# Patient Record
Sex: FEMALE | Race: OTHER | NOT HISPANIC OR LATINO | URBAN - METROPOLITAN AREA
[De-identification: names, ages, dates, MRNs, and addresses within clinical notes are randomized per-mention and may not be internally consistent; named-entity substitution may affect disease eponyms.]

---

## 2021-03-27 ENCOUNTER — EMERGENCY (EMERGENCY)
Facility: HOSPITAL | Age: 61
LOS: 1 days | Discharge: HOME | End: 2021-03-27
Attending: EMERGENCY MEDICINE | Admitting: EMERGENCY MEDICINE
Payer: MEDICAID

## 2021-03-27 VITALS
SYSTOLIC BLOOD PRESSURE: 138 MMHG | TEMPERATURE: 98 F | DIASTOLIC BLOOD PRESSURE: 80 MMHG | HEART RATE: 100 BPM | OXYGEN SATURATION: 100 % | RESPIRATION RATE: 18 BRPM

## 2021-03-27 LAB — SARS-COV-2 RNA SPEC QL NAA+PROBE: SIGNIFICANT CHANGE UP

## 2021-03-27 PROCEDURE — 71046 X-RAY EXAM CHEST 2 VIEWS: CPT | Mod: 26

## 2021-03-27 PROCEDURE — 99284 EMERGENCY DEPT VISIT MOD MDM: CPT

## 2021-03-27 RX ORDER — LORATADINE 10 MG/1
1 TABLET ORAL
Qty: 10 | Refills: 0
Start: 2021-03-27 | End: 2021-04-05

## 2021-03-27 RX ORDER — ALBUTEROL 90 UG/1
1 AEROSOL, METERED ORAL ONCE
Refills: 0 | Status: COMPLETED | OUTPATIENT
Start: 2021-03-27 | End: 2021-03-27

## 2021-03-27 RX ORDER — FAMOTIDINE 10 MG/ML
1 INJECTION INTRAVENOUS
Qty: 10 | Refills: 0
Start: 2021-03-27 | End: 2021-04-05

## 2021-03-27 RX ADMIN — Medication 100 MILLIGRAM(S): at 12:44

## 2021-03-27 RX ADMIN — ALBUTEROL 1 PUFF(S): 90 AEROSOL, METERED ORAL at 12:44

## 2021-03-27 NOTE — ED PROVIDER NOTE - NSFOLLOWUPINSTRUCTIONS_ED_ALL_ED_FT
1) Please follow up with your PCP in the next week.    2) You were sent medications to your pharmacy. Please use as prescribed.    3) Please return to ED if you develop increasing shortness of breath, fevers, chest pain, or any other new concerning symptoms. 1) Please follow up with your PCP in the next week.    2) You were sent medications to your pharmacy. Please use as prescribed.  - Claritin: please take 1 pill a day  - Tessalon Perles: You can take 1 pill, 3 time a day, as needed for cough  - Pepcid: For reflux. Can take 1 pill at bedtime per day  - Hycodan: Please drink 5 mililiters every 6 hours, as needed for cough    3) Please return to ED if you develop increasing shortness of breath, fevers, chest pain, or any other new concerning symptoms.

## 2021-03-27 NOTE — ED PROVIDER NOTE - OBJECTIVE STATEMENT
59 y/o F, PMH of GERD, presents to ED c/o chronic dry cough x years. Pt states that when she has the cough she feels like she "cannot speak" and that it causes her chest to hurt. She states that when she coughs she also urinates herself a little. Per family, the cough mostly happens during the winter months. Pt denies smoking hx. Pt denies fever, chills, abd pain, n/v/d, dysuria, frequent urination, SOB, or LE edema.

## 2021-03-27 NOTE — ED PROVIDER NOTE - ATTENDING CONTRIBUTION TO CARE
Dr. Barrios:  I have personally performed a face to face bedside history and physical examination of this patient. I have discussed the history, examination, review of systems, assessment and plan of management with the resident. I have reviewed the electronic medical record and amended it to reflect my history, review of systems, physical exam, assessment and plan.    60F h/o GERD presents with c/o chronic cough x years.  States that she has episodic coughing fits during which she feels she "cannot speak", +associated chest pain when coughing.  Worse during winter months.  ROS otherwise negative.  Had been given various "cold remedies" by her PCP with little improvement.    Exam:  - nad  - rrr   -ctab   -abd soft ntnd    A/P  - chronic cough, consider intrinsic lung disease vs post-nasal drip vs GERD, r/o PNA  - CXR  - cough meds, f/u PCP

## 2021-03-27 NOTE — ED PROVIDER NOTE - PATIENT PORTAL LINK FT
You can access the FollowMyHealth Patient Portal offered by [facility] by registering at the following website: [email or web address]. By joining [employer]’s FollowMyHealth portal, you will also be able to view your health information using other applications (apps) compatible with our system.

## 2021-03-27 NOTE — ED ADULT TRIAGE NOTE - CHIEF COMPLAINT QUOTE
pt s son states cough since february, no fever shills or sob. son states mom gets this cough every year.  pt has not been tested/  pt c/o rib pain from cough/  pt speaks Hungarian  son s  Mathew

## 2021-03-27 NOTE — ED PROVIDER NOTE - CLINICAL SUMMARY MEDICAL DECISION MAKING FREE TEXT BOX
61 y/o F, PMH of GERD, presents to ED c/o chronic dry cough x years a/w post-tussive CP. No CP at rest. Pt denies f/c, SOB, abd pain, or n/v/d.   Likely chronic bronchitis vs. viral syndrome. Low suspicion for PNA.  Will check CXR and covid swab. Will also give ventolin and tessalon perles for relief. Reassess. Likely d/c home w/ PCP f/u.

## 2021-05-25 NOTE — ED PROVIDER NOTE - NS ED MD DISPO DISCHARGE CCDA
I leida texted Dr Ruth Chaudhari that patient is still having 10 out of 10 pain in her neck area -she had 100 scg of IV Fentanyl in PACU   He replied that her chest X-ray was okay and no furthur orders on pt 
I leida texted the anesthesiologist to come over and assess pt for her continuing neck pain of 8/10-then I went to lunch and gave report to Woodward Dancer RN 
Patient/Caregiver provided printed discharge information.

## 2021-11-15 PROBLEM — Z00.00 ENCOUNTER FOR PREVENTIVE HEALTH EXAMINATION: Status: ACTIVE | Noted: 2021-11-15

## 2021-11-15 PROBLEM — K21.9 GASTRO-ESOPHAGEAL REFLUX DISEASE WITHOUT ESOPHAGITIS: Chronic | Status: ACTIVE | Noted: 2021-03-27

## 2021-11-18 ENCOUNTER — APPOINTMENT (OUTPATIENT)
Dept: NEUROSURGERY | Facility: CLINIC | Age: 61
End: 2021-11-18
Payer: MEDICAID

## 2021-11-18 VITALS
HEIGHT: 59 IN | DIASTOLIC BLOOD PRESSURE: 84 MMHG | HEART RATE: 84 BPM | BODY MASS INDEX: 32.25 KG/M2 | SYSTOLIC BLOOD PRESSURE: 136 MMHG | TEMPERATURE: 97.3 F | WEIGHT: 160 LBS | OXYGEN SATURATION: 100 %

## 2021-11-18 DIAGNOSIS — Z78.9 OTHER SPECIFIED HEALTH STATUS: ICD-10-CM

## 2021-11-18 DIAGNOSIS — M24.28 DISORDER OF LIGAMENT, VERTEBRAE: ICD-10-CM

## 2021-11-18 DIAGNOSIS — M54.12 RADICULOPATHY, CERVICAL REGION: ICD-10-CM

## 2021-11-18 DIAGNOSIS — M54.17 RADICULOPATHY, LUMBOSACRAL REGION: ICD-10-CM

## 2021-11-18 DIAGNOSIS — Z87.39 PERSONAL HISTORY OF OTHER DISEASES OF THE MUSCULOSKELETAL SYSTEM AND CONNECTIVE TISSUE: ICD-10-CM

## 2021-11-18 PROCEDURE — 99203 OFFICE O/P NEW LOW 30 MIN: CPT

## 2021-11-18 RX ORDER — GABAPENTIN 300 MG/1
300 CAPSULE ORAL
Refills: 0 | Status: ACTIVE | COMMUNITY

## 2021-11-18 RX ORDER — DICLOFENAC POTASSIUM 50 MG/1
50 TABLET, COATED ORAL
Refills: 0 | Status: ACTIVE | COMMUNITY

## 2021-11-18 RX ORDER — CYCLOBENZAPRINE HYDROCHLORIDE 10 MG/1
10 TABLET, FILM COATED ORAL
Refills: 0 | Status: ACTIVE | COMMUNITY

## 2021-11-18 RX ORDER — AMITRIPTYLINE HYDROCHLORIDE 25 MG/1
25 TABLET, FILM COATED ORAL
Refills: 0 | Status: ACTIVE | COMMUNITY

## 2021-11-19 NOTE — PHYSICAL EXAM
[General Appearance - Alert] : alert [General Appearance - In No Acute Distress] : in no acute distress [Oriented To Time, Place, And Person] : oriented to person, place, and time [Impaired Insight] : insight and judgment were intact [Affect] : the affect was normal [2+] : Patella left 2+ [No Visual Abnormalities] : no visible abnormailities [Antalgic] : antalgic [5] : 5/5 Ankle Plantar Flexion (S1) [Intact] : all reflexes within normal limits bilaterally [Straight-Leg Raise Test - Left] : straight leg raise of the left leg was negative [Straight-Leg Raise Test - Right] : straight leg raise  of the right leg was negative [4] : 4/5 Small Finger Abduction (T1)

## 2021-11-19 NOTE — HISTORY OF PRESENT ILLNESS
[> 3 months] : more  than 3 months [de-identified] : The patient is a 61 y/o, female with a hx of cervical laminoplasty C3-6 Dr. Santa Lopez at [place] in 2019 c/o mid back pain. She reports the pain starts on the Right side where her bra strap is. She also has burning sensation with numbness and tingling of b/l shoulders radiating to hands/ fingers. She rates the pain an 8/10. The pt has LOB and has fallen twice, last fall was last month. She also has b/l posterior aspect of leg burning up to her knee with plantar feet b/l pain. The pt has been undergoing PT which helps her ROM. The pt has not been seen by pain management. She has been taking gabapentin, amitriptyline, cyclobenzaprine, and Tylenol. . \par The pt had CT cervical spine 11/5/21.

## 2021-11-19 NOTE — REVIEW OF SYSTEMS
[As Noted in HPI] : as noted in HPI [Abnormal Sensation] : an abnormal sensation [Difficulty Walking] : difficulty walking [Negative] : Heme/Lymph [de-identified] : Neck and lower back pain

## 2021-11-19 NOTE — ASSESSMENT
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 \par - Will obtain MRI cervical and lumbar spine to examine herniation and soft tissue. Will also obtain cervical and lumbar xray to r/o instability\par - Pt with ossification of pll, surgery recommended. Pt counseled. All questions and concerns addressed. Spent more than 30 min with pt. \par - Pt to consider surgery and f/u in near future. Advised no injections to spine taking into consideration there is no space.

## 2021-12-23 ENCOUNTER — APPOINTMENT (OUTPATIENT)
Dept: NEUROSURGERY | Facility: CLINIC | Age: 61
End: 2021-12-23
Payer: MEDICAID

## 2021-12-23 VITALS
SYSTOLIC BLOOD PRESSURE: 111 MMHG | HEART RATE: 88 BPM | HEIGHT: 59 IN | DIASTOLIC BLOOD PRESSURE: 72 MMHG | BODY MASS INDEX: 32.25 KG/M2 | TEMPERATURE: 97.9 F | OXYGEN SATURATION: 100 % | WEIGHT: 160 LBS

## 2021-12-23 DIAGNOSIS — M54.2 CERVICALGIA: ICD-10-CM

## 2021-12-23 PROCEDURE — 99213 OFFICE O/P EST LOW 20 MIN: CPT

## 2021-12-23 NOTE — ASSESSMENT
[FreeTextEntry1] : Her MRI of the cervical spine is better than pre op. She does not want surgery. I think her best option is to continue with PT.

## 2021-12-23 NOTE — HISTORY OF PRESENT ILLNESS
[> 3 months] : more  than 3 months [de-identified] : 12-23-21 She comes here for follow up. She had her MRi's done. She still complaints of right arm pain and flank pain. She states she does not want to consider surgery. She has no bowel or bladder incontinence. \par \par 12-15-21\par The patient is a 59 y/o, female with a hx of cervical laminoplasty C3-6 Dr. Santa Lopez at [place] in 2019 c/o mid back pain. She reports the pain starts on the Right side where her bra strap is. She also has burning sensation with numbness and tingling of b/l shoulders radiating to hands/ fingers. She rates the pain an 8/10. The pt has LOB and has fallen twice, last fall was last month. She also has b/l posterior aspect of leg burning up to her knee with plantar feet b/l pain. The pt has been undergoing PT which helps her ROM. The pt has not been seen by pain management. She has been taking gabapentin, amitriptyline, cyclobenzaprine, and Tylenol. . \par The pt had CT cervical spine 11/5/21.

## 2021-12-23 NOTE — PHYSICAL EXAM
[General Appearance - Alert] : alert [General Appearance - In No Acute Distress] : in no acute distress [Oriented To Time, Place, And Person] : oriented to person, place, and time [Impaired Insight] : insight and judgment were intact [Affect] : the affect was normal [2+] : Patella left 2+ [No Visual Abnormalities] : no visible abnormailities [Straight-Leg Raise Test - Left] : straight leg raise of the left leg was negative [Straight-Leg Raise Test - Right] : straight leg raise  of the right leg was negative [Antalgic] : antalgic [4] : 4/5 Knee Extensor (L3) [5] : 5/5 Ankle Plantar Flexion (S1) [Intact] : all reflexes within normal limits bilaterally

## 2022-06-29 ENCOUNTER — APPOINTMENT (OUTPATIENT)
Dept: UROGYNECOLOGY | Facility: CLINIC | Age: 62
End: 2022-06-29

## 2022-06-29 VITALS
WEIGHT: 148 LBS | DIASTOLIC BLOOD PRESSURE: 89 MMHG | SYSTOLIC BLOOD PRESSURE: 129 MMHG | BODY MASS INDEX: 29.84 KG/M2 | HEART RATE: 88 BPM | HEIGHT: 59 IN

## 2022-06-29 DIAGNOSIS — N39.3 STRESS INCONTINENCE (FEMALE) (MALE): ICD-10-CM

## 2022-06-29 DIAGNOSIS — N39.41 URGE INCONTINENCE: ICD-10-CM

## 2022-06-29 DIAGNOSIS — R35.0 FREQUENCY OF MICTURITION: ICD-10-CM

## 2022-06-29 PROCEDURE — 99204 OFFICE O/P NEW MOD 45 MIN: CPT | Mod: 25

## 2022-06-29 PROCEDURE — 51701 INSERT BLADDER CATHETER: CPT

## 2022-06-29 PROCEDURE — 99072 ADDL SUPL MATRL&STAF TM PHE: CPT

## 2022-06-29 NOTE — DISCUSSION/SUMMARY
[FreeTextEntry1] : Patient presents with symptoms of mixed urinary incontinence. We discussed possible etiologies of her symptoms including both stress urinary incontinence and overactive bladder. We reviewed management options for both conditions. I recommend further workup of her urinary symptoms with urodynamic testing. We reviewed behavioral and fluid modifications. Written and verbal instructions  were provided to her as well. She will return to my office for urodynamics and followup with me to discuss results and management options further.

## 2022-06-29 NOTE — PHYSICAL EXAM
[Chaperone Present] : A chaperone was present in the examining room during all aspects of the physical examination [Labia Majora] : were normal [Normal Appearance] : general appearance was normal [Atrophy] : atrophy [Normal] : no abnormalities [Exam Deferred] : was deferred [FreeTextEntry1] : General: Well, appearing. Alert and orientated. No acute distress\par HEENT: Normocephalic, atraumatic and extraocular muscles appear to be intact \par Neck: Full range of motion, no obvious lymphadenopathy, deformities, or masses noted \par Respiratory: Speaking in full sentences comfortably, normal work of breathing and no cough during visit\par Musculoskeletal: active full range of motion in extremities \par Extremities: No upper extremity edema noted\par Skin: no obvious rash or skin lesions\par Neuro: Orientated X 3, speech is fluent, normal rate\par Psych: Normal mood and affect \par   [Tenderness] : ~T no ~M abdominal tenderness observed [Distended] : not distended

## 2022-06-29 NOTE — HISTORY OF PRESENT ILLNESS
[Unable To Restrain Bowel Movement] : moderate [Feelings Of Urinary Urgency] : moderate [Urinary Frequency More Than Twice At Night (Nocturia)] : no nocturia [Urinary Tract Infection] : mild [] : years ago [de-identified] : 0-1 [FreeTextEntry1] : \par \par PSH: h/o cervical laminplasty\par \par 2-3 c tea daily

## 2022-06-29 NOTE — REASON FOR VISIT
[Family Member] : family member [[employer] ] : provided by [employer]   [Questionnaire Received] : Patient questionnaire received [Intake Form Reviewed] : Patient intake form with past medical history, surgical history, family history and social history reviewed today [Urine Frequency] : urine frequency [TWNoteComboBox1] : Gael

## 2022-07-01 ENCOUNTER — NON-APPOINTMENT (OUTPATIENT)
Age: 62
End: 2022-07-01

## 2022-07-01 DIAGNOSIS — Z78.9 OTHER SPECIFIED HEALTH STATUS: ICD-10-CM

## 2022-07-01 LAB — BACTERIA UR CULT: NORMAL

## 2022-07-07 ENCOUNTER — NON-APPOINTMENT (OUTPATIENT)
Age: 62
End: 2022-07-07

## 2022-07-07 DIAGNOSIS — R31.29 OTHER MICROSCOPIC HEMATURIA: ICD-10-CM

## 2022-07-07 LAB
APPEARANCE: ABNORMAL
BACTERIA: NEGATIVE
BILIRUBIN URINE: NEGATIVE
BLOOD URINE: NEGATIVE
CALCIUM OXALATE CRYSTALS: ABNORMAL
COLOR: NORMAL
GLUCOSE QUALITATIVE U: NEGATIVE
HYALINE CASTS: 0 /LPF
KETONES URINE: NEGATIVE
LEUKOCYTE ESTERASE URINE: NEGATIVE
MICROSCOPIC-UA: NORMAL
NITRITE URINE: NEGATIVE
PH URINE: 8
PROTEIN URINE: NORMAL
RED BLOOD CELLS URINE: 4 /HPF
SPECIFIC GRAVITY URINE: 1.02
SQUAMOUS EPITHELIAL CELLS: 1 /HPF
URINE COMMENTS: NORMAL
UROBILINOGEN URINE: NORMAL
WHITE BLOOD CELLS URINE: 2 /HPF

## 2022-07-28 ENCOUNTER — APPOINTMENT (OUTPATIENT)
Dept: UROGYNECOLOGY | Facility: CLINIC | Age: 62
End: 2022-07-28

## 2022-08-02 ENCOUNTER — APPOINTMENT (OUTPATIENT)
Dept: UROGYNECOLOGY | Facility: CLINIC | Age: 62
End: 2022-08-02

## 2022-08-11 ENCOUNTER — NON-APPOINTMENT (OUTPATIENT)
Age: 62
End: 2022-08-11

## 2023-05-01 ENCOUNTER — EMERGENCY (EMERGENCY)
Facility: HOSPITAL | Age: 63
LOS: 1 days | Discharge: HOME | End: 2023-05-01
Attending: EMERGENCY MEDICINE | Admitting: EMERGENCY MEDICINE
Payer: MEDICAID

## 2023-05-01 VITALS
OXYGEN SATURATION: 100 % | HEART RATE: 89 BPM | DIASTOLIC BLOOD PRESSURE: 72 MMHG | SYSTOLIC BLOOD PRESSURE: 149 MMHG | RESPIRATION RATE: 18 BRPM | TEMPERATURE: 98 F

## 2023-05-01 PROCEDURE — 99284 EMERGENCY DEPT VISIT MOD MDM: CPT

## 2023-05-01 PROCEDURE — 71101 X-RAY EXAM UNILAT RIBS/CHEST: CPT | Mod: 26,LT

## 2023-05-01 PROCEDURE — 72131 CT LUMBAR SPINE W/O DYE: CPT | Mod: 26,MA

## 2023-05-01 RX ORDER — LIDOCAINE 4 G/100G
1 CREAM TOPICAL ONCE
Refills: 0 | Status: COMPLETED | OUTPATIENT
Start: 2023-05-01 | End: 2023-05-01

## 2023-05-01 RX ORDER — ACETAMINOPHEN 500 MG
650 TABLET ORAL ONCE
Refills: 0 | Status: COMPLETED | OUTPATIENT
Start: 2023-05-01 | End: 2023-05-01

## 2023-05-01 RX ORDER — KETOROLAC TROMETHAMINE 30 MG/ML
15 SYRINGE (ML) INJECTION ONCE
Refills: 0 | Status: DISCONTINUED | OUTPATIENT
Start: 2023-05-01 | End: 2023-05-01

## 2023-05-01 RX ADMIN — Medication 15 MILLIGRAM(S): at 19:57

## 2023-05-01 RX ADMIN — LIDOCAINE 1 PATCH: 4 CREAM TOPICAL at 21:10

## 2023-05-01 RX ADMIN — Medication 650 MILLIGRAM(S): at 19:57

## 2023-05-01 NOTE — ED PROVIDER NOTE - CLINICAL SUMMARY MEDICAL DECISION MAKING FREE TEXT BOX
62-year-old female with past medical history edema on Lasix, currently on cefuroxime for a UTI presenting to the ER with low back pain after a fall.  On exam patient appears uncomfortable, vitals within normal, tenderness along lumbar spine, neurovascularly intact.  Patient is able to ambulate although with supportive assistance from her son.  Plan: CT lumbar spine to rule out fracture, pain control.

## 2023-05-01 NOTE — ED PROVIDER NOTE - PHYSICAL EXAMINATION
MSK: lumbar midline spinal tenderness, no c-spine tenderness  strength 5/5 in b/l LE, sensation intact and equal b/l  pt is able to ambulate with assistance

## 2023-05-01 NOTE — ED ADULT TRIAGE NOTE - CHIEF COMPLAINT QUOTE
Pt c/o lower back pain s/p slip and fall in the bathroom about 1 week ago with numbness and tingling to BLE. Denies had injury or LOC. Hx of spinal surgery.

## 2023-05-01 NOTE — ED PROVIDER NOTE - PATIENT PORTAL LINK FT
You can access the FollowMyHealth Patient Portal offered by [facility] by registering at the following website: [email or web address]. By joining [employer]’s FollowMyHealth portal, you will also be able to view your health information using other applications (apps) compatible with our system.

## 2023-05-01 NOTE — ED PROVIDER NOTE - PROGRESS NOTE DETAILS
NENA Santiago PGY2 took sign out from ALFREDO doan pending CTr. Results show multifocal neuroforaminal stenosis. no cauda symptoms. joint decision with pt and family that she would be safer in the hospital in the setting of recurrent falls, however they prefer to continue to encourage her to use her walker and do PT outpatient. will give neurosurg and PT follow up.

## 2023-05-01 NOTE — ED ADULT TRIAGE NOTE - CCCP TRG CHIEF CMPLNT
Verified Results  MA FFDM SCREEN NICANOR W OSWALD W CAD 18Oct2017 02:10PM KYLIE VASQUEZ   Ordering Provider: KYLIE VASQUEZ.    Reason For Study: yearly screening,yearly screening.   [Oct 19, 2017 8:49AM KYLIE VASQUEZ]  enriqueta WNL     Test Name Result Flag Reference   MA FFDM SCREEN NICANOR W OSWALD W CAD (Report)     Accession #    [id number]    #[id number] - MA FFDM SCREEN NICNAOR W OSWALD W CAD    BILATERAL DIGITAL SCREENING MAMMOGRAM 3D/2D WITH CAD: 10/18/2017    CLINICAL HISTORY:Routine annual screening mammogram.    COMPARISON:   Comparison is made to exams dated: 10/3/2016 mammogram, 9/21/2015 mammogram, 9/15/2014   mammogram, 8/22/2013 mammogram, 8/14/2012 mammogram, and 6/2/2011 mammogram - [facility].    FINDINGS:  There are scattered fibroglandular elements in both breasts.    There are benign calcifications in both breasts.    No significant masses, calcifications, or other findings are seen in either breast.  Current study was also evaluated with a Computer Aided Detection (CAD) system. Digital Breast   Tomosynthesis (DBT) images were obtained and used to assist in the interpretation of this   examination.  There has been no significant interval change.    IMPRESSION: BENIGN    There is no mammographic evidence of malignancy. A 1 year screening mammogram is recommended.    MAMMOGRAPHY BI-RADS: 2 BENIGN    Rodri Purvis M.D.  rt/penrad:10/18/2017 16:20:44    Imaging Technologist: Rebecca OliveiraF [facility]  letter sent: Normal Single Exam     **** FINAL ****    Dictated By:   RODRI FLANAGAN MD    Electronically Reviewed and Approved By:  RODRI FLANAGAN MD       
back pain general

## 2023-05-01 NOTE — ED ADULT NURSE NOTE - OBJECTIVE STATEMENT
p.t is a 62y old female received awake and responsive, c/o of lower back pain s/p slip and fall one week ago, neg loc, p.t ambulatory

## 2023-05-01 NOTE — ED PROVIDER NOTE - NSFOLLOWUPINSTRUCTIONS_ED_ALL_ED_FT
You were seen in the Emergency Department for back pain and falls.     1) Advance activity as tolerated.   2) Continue all previously prescribed medications as directed.    3) Follow up with your primary care physician in 24-48 hours - take copies of your results.    4) Return to the Emergency Department for worsening or persistent symptoms, and/or ANY NEW OR CONCERNING SYMPTOMS.      you prefer to continue your care on an outpatient basis.     please call the neurosurgery office in the morning to make an appointment for follow up.     if you notice any difficulty urinating, losing control of your urine or bowel movements, or loss of sensation in the groin or legs, return to the ER.     tylenol and motrin can be taken at home for pain.     use your walker every time you walk.

## 2023-05-01 NOTE — ED ADULT TRIAGE NOTE - PAIN RATING/NUMBER SCALE (0-10): ACTIVITY
[address]  coding opportunities       Chart reviewed, no opportunity found: CHART REVIEWED, NO OPPORTUNITY FOUND        Patients Insurance     Medicare Insurance: Medicare
9 (severe pain)

## 2023-05-01 NOTE — ED PROVIDER NOTE - NSFOLLOWUPCLINICS_GEN_ALL_ED_FT
[facility] Neurosurgery  Neurosurgery  Referral Assistance Program  [place]   Phone:   Fax:     [facility]  Physical Therapy  Multiple Location  [place]   Phone: [phone]  Fax:

## 2023-05-01 NOTE — ED PROVIDER NOTE - ATTENDING APP SHARED VISIT CONTRIBUTION OF CARE
Agree with PA note  62-year-old female presents after fall in bathtub approximately 1 week ago.  Patient is Maori speaking and using her son as  with her consent.  Patient had an x-ray performed on Wednesday and unclear what the results were.  Patient has had prior cervical spine surgery.  Per son has fallen approximately 10 times over the last 2 years.   has walker at home patient refuses to use.  States since fall patient has had back pain to point that she is not stable on her feet.  States has fallen twice more in the kitchen after fall in bathroom.  States states stepping backwards and falls unable to maintain steady gait.  No prior history of CVAs.  Patient denies any focal weakness in the legs, sensory deficits.  Physical exam  Patient seated in chair in no distress, has distress standing up and sitting on stretcher  Vital signs stable  Lungs clear to auscultation bilaterally  S1-S2 no murmurs rubs or gallops  Back no midline tenderness or deformity, paralumbar tenderness  Neuro cranial nerves intact, 5/5 motor upper and lower extremity, sensation intact, no saddle anesthesia, no facial droop  Impression  Multiple falls, does not appear to be infectious in nature, appears to be global weakness which has been present for some time now, given recent fall and likely back spasm/back pain has caused patient to have increased propensity for another fall.  Feel need for CT of lumbar spine and likely admission for physical therapy and rehab

## 2023-05-02 VITALS
DIASTOLIC BLOOD PRESSURE: 68 MMHG | SYSTOLIC BLOOD PRESSURE: 133 MMHG | HEART RATE: 86 BPM | OXYGEN SATURATION: 100 % | RESPIRATION RATE: 16 BRPM | TEMPERATURE: 98 F

## 2023-08-01 ENCOUNTER — EMERGENCY (EMERGENCY)
Facility: HOSPITAL | Age: 63
LOS: 1 days | Discharge: HOME | End: 2023-08-01
Attending: EMERGENCY MEDICINE
Payer: MEDICAID

## 2023-08-01 VITALS
TEMPERATURE: 98 F | DIASTOLIC BLOOD PRESSURE: 85 MMHG | HEIGHT: 64 IN | HEART RATE: 72 BPM | RESPIRATION RATE: 16 BRPM | WEIGHT: 147.93 LBS | SYSTOLIC BLOOD PRESSURE: 134 MMHG | OXYGEN SATURATION: 98 %

## 2023-08-01 VITALS
SYSTOLIC BLOOD PRESSURE: 121 MMHG | DIASTOLIC BLOOD PRESSURE: 79 MMHG | OXYGEN SATURATION: 98 % | TEMPERATURE: 98 F | HEART RATE: 70 BPM | RESPIRATION RATE: 17 BRPM

## 2023-08-01 PROCEDURE — 72170 X-RAY EXAM OF PELVIS: CPT | Mod: 26

## 2023-08-01 PROCEDURE — 99284 EMERGENCY DEPT VISIT MOD MDM: CPT | Mod: 25

## 2023-08-01 PROCEDURE — 73562 X-RAY EXAM OF KNEE 3: CPT | Mod: 26,LT

## 2023-08-01 PROCEDURE — 96372 THER/PROPH/DIAG INJ SC/IM: CPT

## 2023-08-01 PROCEDURE — 71101 X-RAY EXAM UNILAT RIBS/CHEST: CPT | Mod: 26

## 2023-08-01 PROCEDURE — 72125 CT NECK SPINE W/O DYE: CPT | Mod: 26,MA

## 2023-08-01 PROCEDURE — 71101 X-RAY EXAM UNILAT RIBS/CHEST: CPT

## 2023-08-01 PROCEDURE — 70450 CT HEAD/BRAIN W/O DYE: CPT | Mod: MA

## 2023-08-01 PROCEDURE — 73610 X-RAY EXAM OF ANKLE: CPT

## 2023-08-01 PROCEDURE — 99284 EMERGENCY DEPT VISIT MOD MDM: CPT

## 2023-08-01 PROCEDURE — 73030 X-RAY EXAM OF SHOULDER: CPT | Mod: 26,LT

## 2023-08-01 PROCEDURE — 70450 CT HEAD/BRAIN W/O DYE: CPT | Mod: 26,MA

## 2023-08-01 PROCEDURE — 73610 X-RAY EXAM OF ANKLE: CPT | Mod: 26,LT

## 2023-08-01 PROCEDURE — 73562 X-RAY EXAM OF KNEE 3: CPT

## 2023-08-01 PROCEDURE — 73030 X-RAY EXAM OF SHOULDER: CPT

## 2023-08-01 PROCEDURE — 72125 CT NECK SPINE W/O DYE: CPT | Mod: MA

## 2023-08-01 PROCEDURE — 72170 X-RAY EXAM OF PELVIS: CPT

## 2023-08-01 RX ORDER — KETOROLAC TROMETHAMINE 30 MG/ML
30 SYRINGE (ML) INJECTION ONCE
Refills: 0 | Status: DISCONTINUED | OUTPATIENT
Start: 2023-08-01 | End: 2023-08-01

## 2023-08-01 RX ORDER — ACETAMINOPHEN 500 MG
975 TABLET ORAL ONCE
Refills: 0 | Status: COMPLETED | OUTPATIENT
Start: 2023-08-01 | End: 2023-08-01

## 2023-08-01 RX ADMIN — Medication 975 MILLIGRAM(S): at 15:56

## 2023-08-01 RX ADMIN — Medication 30 MILLIGRAM(S): at 19:53

## 2023-08-01 NOTE — ED PROVIDER NOTE - CLINICAL SUMMARY MEDICAL DECISION MAKING FREE TEXT BOX
Albert: Patient is 62-year-old who had a fall while leaving her doctor's office.  Patient has had multiple falls recently is a little concerned about gait instability.  Patient with no evidence of weakness or stroke.  Patient fell and hit her head and her left arm and has pain will CT the areas where she fell and x-ray of the arm.  Patient may not have any fractures and will be treated for pain.  Patient gait instability should be discussed with their doctor because these falls could lead to something more serious in the future.

## 2023-08-01 NOTE — ED PROVIDER NOTE - NSFOLLOWUPCLINICS_GEN_ALL_ED_FT
[facility] Orthopedic Surgery  Orthopedic Surgery  [address], 3rd & 4th [place] [address]  Phone: [phone]  Fax:     [facility] - Primary Care  Primary Care  [address] [place] [address]  Phone: [phone]  Fax:     [facility]  Podiatry  [address], [facility] - 3rd [place] [address]  Phone: [phone]  Fax:

## 2023-08-01 NOTE — ED PROVIDER NOTE - PHYSICAL EXAMINATION
General: A&O x 3, well appearing, awake, and in no apparent distress.  HEENT: Normocephalic, atraumatic. Airway is patent. No evidence of head trauma/hematoma/laceration; no focal tenderness  Neck supple, no c-spine tenderness or deformity, overlying surgical scar   Cardiac: Normal rate, regular rhythm.  Heart sounds S1, S2.  No murmurs, rubs or gallops.  Lungs: Breath sounds clear and equal bilaterally.  Abd: Abdomen soft, non-tender, no guarding.  Neuro: no focal deficits. PERRLA, EOMI. No facial droop. 5/5 strength upper and lower extremities.   Did not ambulate.  MSK: +L shoulder tenderness, small area of ecchymosis, decreased ROM   L lateral rib TTP, no deformity or ecchymosis   L pelvic tenderness, no midline lumbar TTP   L knee and ankle nontender, full ROM

## 2023-08-01 NOTE — ED PROVIDER NOTE - NS ED ROS FT
Constitutional: No fever or chills  Eyes: No visual changes, eye pain or redness  HEENT: No throat pain, ear pain, nasal pain. No nose bleeding. +head injury, posterior head pain   CV: No chest pain or lower extremity edema  Resp: No SOB no cough  GI: No abd pain. No nausea or vomiting. No diarrhea. No constipation.   : No dysuria, hematuria.   MSK: +L shoulder, rib, hip, knee and ankle pain  Skin: No rash  Neuro: No headache. No numbness or tingling. No weakness.

## 2023-08-01 NOTE — ED PROVIDER NOTE - NSFOLLOWUPINSTRUCTIONS_ED_ALL_ED_FT
You were seen in the emergency department for head injury and fall. The imaging tests were normal.  Please read all attached patient information, read all additional instructions below, and follow-up with all providers as directed.    1) Follow-up with your primary care provider in 1-2 days.  Follow up with orthopedics and podiatry as needed for pain.     2) Continue to take all medications as prescribed.    3) Rest and stay hydrated. Pain can be managed with Acetaminophen (aka Tylenol) and Ibuprofen (aka Motrin or Advil) over the counter as directed.    4) Return to the ER for any new or worsening symptoms.      Please read all attached patient information.

## 2023-08-01 NOTE — ED PROVIDER NOTE - CARE PLAN
1 Principal Discharge DX:	Fall  Secondary Diagnosis:	Head trauma   Principal Discharge DX:	Contusion of head  Secondary Diagnosis:	Contusion of left arm  Secondary Diagnosis:	Fall from standing

## 2023-08-01 NOTE — ED PROVIDER NOTE - ATTENDING APP SHARED VISIT CONTRIBUTION OF CARE
I performed a history and physical exam of the patient and discussed their management with the resident and /or advanced care provider. I reviewed the resident and /or ACP's note and agree with the documented findings and plan of care. My medical decision making and observations are found above.  Lungs clear, lt arm with diffuse pain from hand up through shoulder. + head contusion on posteroir scalp. no bleeding

## 2023-08-01 NOTE — ED ADULT NURSE NOTE - NSFALLHARMRISKINTERV_ED_ALL_ED
Assistance OOB with selected safe patient handling equipment if applicable/Assistance with ambulation/Communicate risk of Fall with Harm to all staff, patient, and family/Monitor gait and stability/Provide visual cue: red socks, yellow wristband, yellow gown, etc/Reinforce activity limits and safety measures with patient and family/Bed in lowest position, wheels locked, appropriate side rails in place/Call bell, personal items and telephone in reach/Instruct patient to call for assistance before getting out of bed/chair/stretcher/Non-slip footwear applied when patient is off stretcher/[place] to call system/Physically safe environment - no spills, clutter or unnecessary equipment/Purposeful Proactive Rounding/Room/bathroom lighting operational, light cord in reach

## 2023-08-01 NOTE — ED ADULT NURSE NOTE - OBJECTIVE STATEMENT
62 yr old female who is unsteady on her feet from spinal surg came in after tripping and falling leaving the doctors. she was with her son and she misstepped. no medical issues. on assessment a and o x 3 lungs clear abd soft non tender no swelling in extremities no n/v/d no fevers. pain in left shoulder which was already injured last thursday from fall, right toe pain and left rib pain.

## 2023-08-01 NOTE — ED ADULT TRIAGE NOTE - NSWEIGHTCALCTOOLDRUG_GEN_A_CORE
1. Caller Name:   [facility]/pharmacy #[id number] - [place] - [address] [place] [address]  Phone: [phone] Fax: [phone]     Call Back Number: [phone]      Patient approves a detailed voicemail message: no    Fax recieved from pharmacy stating the prescribed medication Carbamide peroxide powder is on backorder, they require an alternative medication be prescribed.     used

## 2023-08-01 NOTE — ED PROVIDER NOTE - OBJECTIVE STATEMENT
61 y/o F with hx of DM, HTN, edema, GERD presenting with fall today. Son at bedside helping with translation, states patient lost her balance today while going up a step outside and fell backwards. Pt struck her head on the ground but did not lose consciousness, witnessed by son. Pt denies any preceding chest pain, sob, dizziness or headache. Currently c/o posterior head pain, L shoulder pain, rib pain, knee pain and ankle pain. Son reports pt has had frequent falls as she loses her balance easily and does not want to use her walker at home. Son notes there are 3 family members at home taking care of her and have all the equipment to help her around the house, decline any  at this time. Denies fevers, slurred speech, cp, sob, abd pain, vomiting, focal weakness, numbness, tingling.

## 2023-08-01 NOTE — ED PROVIDER NOTE - PATIENT PORTAL LINK FT
You can access the FollowMyHealth Patient Portal offered by [facility] by registering at the following website: [email or web address]. By joining [facility]’s FollowMyHealth portal, you will also be able to view your health information using other applications (apps) compatible with our system.

## 2023-08-08 ENCOUNTER — APPOINTMENT (OUTPATIENT)
Dept: ORTHOPEDIC SURGERY | Facility: CLINIC | Age: 63
End: 2023-08-08

## 2024-08-20 ENCOUNTER — EMERGENCY (EMERGENCY)
Facility: HOSPITAL | Age: 64
LOS: 1 days | Discharge: HOME | End: 2024-08-20
Attending: EMERGENCY MEDICINE | Admitting: EMERGENCY MEDICINE
Payer: MEDICAID

## 2024-08-20 VITALS
HEART RATE: 85 BPM | OXYGEN SATURATION: 100 % | DIASTOLIC BLOOD PRESSURE: 74 MMHG | TEMPERATURE: 98 F | SYSTOLIC BLOOD PRESSURE: 124 MMHG | RESPIRATION RATE: 17 BRPM

## 2024-08-20 VITALS
SYSTOLIC BLOOD PRESSURE: 122 MMHG | TEMPERATURE: 98 F | OXYGEN SATURATION: 98 % | HEART RATE: 81 BPM | HEIGHT: 64 IN | WEIGHT: 147.93 LBS | DIASTOLIC BLOOD PRESSURE: 80 MMHG | RESPIRATION RATE: 16 BRPM

## 2024-08-20 LAB
A1C WITH ESTIMATED AVERAGE GLUCOSE RESULT: 6.5 % — HIGH (ref 4–5.6)
ADD ON TEST-SPECIMEN IN LAB: SIGNIFICANT CHANGE UP
ALBUMIN SERPL ELPH-MCNC: 4 G/DL — SIGNIFICANT CHANGE UP (ref 3.3–5)
ALP SERPL-CCNC: 87 U/L — SIGNIFICANT CHANGE UP (ref 40–120)
ALT FLD-CCNC: 19 U/L — SIGNIFICANT CHANGE UP (ref 4–33)
ANION GAP SERPL CALC-SCNC: 14 MMOL/L — SIGNIFICANT CHANGE UP (ref 7–14)
APPEARANCE UR: CLEAR — SIGNIFICANT CHANGE UP
AST SERPL-CCNC: 21 U/L — SIGNIFICANT CHANGE UP (ref 4–32)
BACTERIA # UR AUTO: ABNORMAL /HPF
BASOPHILS # BLD AUTO: 0.03 K/UL — SIGNIFICANT CHANGE UP (ref 0–0.2)
BASOPHILS NFR BLD AUTO: 0.4 % — SIGNIFICANT CHANGE UP (ref 0–2)
BILIRUB SERPL-MCNC: 0.5 MG/DL — SIGNIFICANT CHANGE UP (ref 0.2–1.2)
BILIRUB UR-MCNC: NEGATIVE — SIGNIFICANT CHANGE UP
BUN SERPL-MCNC: 10 MG/DL — SIGNIFICANT CHANGE UP (ref 7–23)
CALCIUM SERPL-MCNC: 9.4 MG/DL — SIGNIFICANT CHANGE UP (ref 8.4–10.5)
CAST: 0 /LPF — SIGNIFICANT CHANGE UP (ref 0–4)
CHLORIDE SERPL-SCNC: 102 MMOL/L — SIGNIFICANT CHANGE UP (ref 98–107)
CO2 SERPL-SCNC: 23 MMOL/L — SIGNIFICANT CHANGE UP (ref 22–31)
COLOR SPEC: YELLOW — SIGNIFICANT CHANGE UP
CREAT SERPL-MCNC: 0.79 MG/DL — SIGNIFICANT CHANGE UP (ref 0.5–1.3)
DIFF PNL FLD: NEGATIVE — SIGNIFICANT CHANGE UP
EGFR: 83 ML/MIN/1.73M2 — SIGNIFICANT CHANGE UP
EOSINOPHIL # BLD AUTO: 0.38 K/UL — SIGNIFICANT CHANGE UP (ref 0–0.5)
EOSINOPHIL NFR BLD AUTO: 4.9 % — SIGNIFICANT CHANGE UP (ref 0–6)
ESTIMATED AVERAGE GLUCOSE: 140 — SIGNIFICANT CHANGE UP
GLUCOSE SERPL-MCNC: 121 MG/DL — HIGH (ref 70–99)
GLUCOSE UR QL: NEGATIVE MG/DL — SIGNIFICANT CHANGE UP
HCT VFR BLD CALC: 42 % — SIGNIFICANT CHANGE UP (ref 34.5–45)
HGB BLD-MCNC: 13.6 G/DL — SIGNIFICANT CHANGE UP (ref 11.5–15.5)
IANC: 3.54 K/UL — SIGNIFICANT CHANGE UP (ref 1.8–7.4)
IMM GRANULOCYTES NFR BLD AUTO: 0.3 % — SIGNIFICANT CHANGE UP (ref 0–0.9)
KETONES UR-MCNC: NEGATIVE MG/DL — SIGNIFICANT CHANGE UP
LEUKOCYTE ESTERASE UR-ACNC: ABNORMAL
LYMPHOCYTES # BLD AUTO: 3.36 K/UL — HIGH (ref 1–3.3)
LYMPHOCYTES # BLD AUTO: 43 % — SIGNIFICANT CHANGE UP (ref 13–44)
MCHC RBC-ENTMCNC: 29.4 PG — SIGNIFICANT CHANGE UP (ref 27–34)
MCHC RBC-ENTMCNC: 32.4 GM/DL — SIGNIFICANT CHANGE UP (ref 32–36)
MCV RBC AUTO: 90.9 FL — SIGNIFICANT CHANGE UP (ref 80–100)
MONOCYTES # BLD AUTO: 0.49 K/UL — SIGNIFICANT CHANGE UP (ref 0–0.9)
MONOCYTES NFR BLD AUTO: 6.3 % — SIGNIFICANT CHANGE UP (ref 2–14)
NEUTROPHILS # BLD AUTO: 3.54 K/UL — SIGNIFICANT CHANGE UP (ref 1.8–7.4)
NEUTROPHILS NFR BLD AUTO: 45.1 % — SIGNIFICANT CHANGE UP (ref 43–77)
NITRITE UR-MCNC: NEGATIVE — SIGNIFICANT CHANGE UP
NRBC # BLD: 0 /100 WBCS — SIGNIFICANT CHANGE UP (ref 0–0)
NRBC # FLD: 0 K/UL — SIGNIFICANT CHANGE UP (ref 0–0)
NT-PROBNP SERPL-SCNC: <36 PG/ML — SIGNIFICANT CHANGE UP
PH UR: 7.5 — SIGNIFICANT CHANGE UP (ref 5–8)
PLATELET # BLD AUTO: 151 K/UL — SIGNIFICANT CHANGE UP (ref 150–400)
POTASSIUM SERPL-MCNC: 4.2 MMOL/L — SIGNIFICANT CHANGE UP (ref 3.5–5.3)
POTASSIUM SERPL-SCNC: 4.2 MMOL/L — SIGNIFICANT CHANGE UP (ref 3.5–5.3)
PROT SERPL-MCNC: 7.3 G/DL — SIGNIFICANT CHANGE UP (ref 6–8.3)
PROT UR-MCNC: NEGATIVE MG/DL — SIGNIFICANT CHANGE UP
RBC # BLD: 4.62 M/UL — SIGNIFICANT CHANGE UP (ref 3.8–5.2)
RBC # FLD: 14.9 % — HIGH (ref 10.3–14.5)
RBC CASTS # UR COMP ASSIST: 0 /HPF — SIGNIFICANT CHANGE UP (ref 0–4)
SODIUM SERPL-SCNC: 139 MMOL/L — SIGNIFICANT CHANGE UP (ref 135–145)
SP GR SPEC: 1.01 — SIGNIFICANT CHANGE UP (ref 1–1.03)
SQUAMOUS # UR AUTO: 3 /HPF — SIGNIFICANT CHANGE UP (ref 0–5)
UROBILINOGEN FLD QL: 0.2 MG/DL — SIGNIFICANT CHANGE UP (ref 0.2–1)
WBC # BLD: 7.82 K/UL — SIGNIFICANT CHANGE UP (ref 3.8–10.5)
WBC # FLD AUTO: 7.82 K/UL — SIGNIFICANT CHANGE UP (ref 3.8–10.5)
WBC UR QL: 2 /HPF — SIGNIFICANT CHANGE UP (ref 0–5)

## 2024-08-20 PROCEDURE — 93010 ELECTROCARDIOGRAM REPORT: CPT

## 2024-08-20 PROCEDURE — 99284 EMERGENCY DEPT VISIT MOD MDM: CPT

## 2024-08-20 RX ORDER — FUROSEMIDE 10 MG/ML
40 INJECTION, SOLUTION INTRAVENOUS ONCE
Refills: 0 | Status: COMPLETED | OUTPATIENT
Start: 2024-08-20 | End: 2024-08-20

## 2024-08-20 RX ORDER — FUROSEMIDE 10 MG/ML
80 INJECTION, SOLUTION INTRAVENOUS ONCE
Refills: 0 | Status: DISCONTINUED | OUTPATIENT
Start: 2024-08-20 | End: 2024-08-20

## 2024-08-20 RX ORDER — FUROSEMIDE 10 MG/ML
1 INJECTION, SOLUTION INTRAVENOUS
Qty: 14 | Refills: 0
Start: 2024-08-20 | End: 2024-09-02

## 2024-08-20 RX ADMIN — FUROSEMIDE 40 MILLIGRAM(S): 10 INJECTION, SOLUTION INTRAVENOUS at 17:37

## 2024-08-20 NOTE — ED PROVIDER NOTE - NSFOLLOWUPCLINICS_GEN_ALL_ED_FT
[facility] - Primary Care  Primary Care  [address] [place] [address]  Phone: [phone]  Fax:

## 2024-08-20 NOTE — ED ADULT NURSE NOTE - NSFALLRISKINTERV_ED_ALL_ED

## 2024-08-20 NOTE — ED PROVIDER NOTE - ATTENDING CONTRIBUTION TO CARE
Agree with resident note  62-year-old female with history of diabetes, hypertension, multiple falls in the past presents with bilateral lower extremity swelling and pain for 6 months.  Patient states noncompliant with meds, saw PCP 1 month ago.  Of note patient stopped her furosemide approximately 4 months ago.  Patient denies any back pain or neurological symptoms.  Physical exam  Gen: pt well appearing in no respiratory distress  vital signs stable  NCAT  Lungs: CTAB/L  Cardiac: s1 s2 no m/r/g  abdomen: soft/NT/ND  ext: 2+ pitting edema  Neuro: CNs intact 5/5 motor UE and LE; sensation intact; gait stable  skin: no rash  Impression  Likely dependent edema will get labs including BNP, give patient IV Lasix and reassess

## 2024-08-20 NOTE — ED PROVIDER NOTE - NSFOLLOWUPINSTRUCTIONS_ED_ALL_ED_FT
It is important that you take the water pill: Furosamide to improve the swelling in your lower extremities.     Please follow up with a Primary Care Doctor. We have listed the Clinic info for a new Primary Care Doctor.    Peripheral Edema  A person's legs and feet. One leg is normal and the other leg is affected by edema.  Peripheral edema is swelling that is caused by a buildup of fluid. Peripheral edema most often affects the lower legs, ankles, and feet. It can also develop in the arms, hands, and face. The area of the body that has peripheral edema will look swollen. It may also feel heavy or warm. Your clothes may start to feel tight. Pressing on the area may make a temporary dent in your skin (pitting edema). You may not be able to move your swollen arm or leg as much as usual.    There are many causes of peripheral edema. It can happen because of a complication of other conditions such as heart failure, kidney disease, or a problem with your circulation. It also can be a side effect of certain medicines or happen because of an infection. It often happens to women during pregnancy. Sometimes, the cause is not known.    Follow these instructions at home:  Managing pain, stiffness, and swelling    Compression stockings on a person's lower legs.  Raise (elevate) your legs while you are sitting or lying down.  Move around often to prevent stiffness and to reduce swelling.  Do not sit or stand for long periods of time.  Do not wear tight clothing. Do not wear garters on your upper legs.  Exercise your legs to get your circulation going. This helps to move the fluid back into your blood vessels, and it may help the swelling go down.  Wear compression stockings as told by your health care provider. These stockings help to prevent blood clots and reduce swelling in your legs. It is important that these are the correct size. These stockings should be prescribed by your doctor to prevent possible injuries.  If elastic bandages or wraps are recommended, use them as told by your health care provider.  Medicines    Take over-the-counter and prescription medicines only as told by your health care provider.  Your health care provider may prescribe medicine to help your body get rid of excess water (diuretic). Take this medicine if you are told to take it.  General instructions    Eat a low-salt (low-sodium) diet as told by your health care provider. Sometimes, eating less salt may reduce swelling.  Pay attention to any changes in your symptoms.  Moisturize your skin daily to help prevent skin from cracking and draining.  Keep all follow-up visits. This is important.  Contact a health care provider if:  You have a fever.  You have swelling in only one leg.  You have increased swelling, redness, or pain in one or both of your legs.  You have drainage or sores at the area where you have edema.  Get help right away if:  You have edema that starts suddenly or is getting worse, especially if you are pregnant or have a medical condition.  You develop shortness of breath, especially when you are lying down.  You have pain in your chest or abdomen.  You feel weak.  You feel like you will faint.  These symptoms may be an emergency. Get help right away. Call 911.  Do not wait to see if the symptoms will go away.  Do not drive yourself to the hospital.  Summary  Peripheral edema is swelling that is caused by a buildup of fluid. Peripheral edema most often affects the lower legs, ankles, and feet.  Move around often to prevent stiffness and to reduce swelling. Do not sit or stand for long periods of time.  Pay attention to any changes in your symptoms.  Contact a health care provider if you have edema that starts suddenly or is getting worse, especially if you are pregnant or have a medical condition.  Get help right away if you develop shortness of breath, especially when lying down.  This information is not intended to replace advice given to you by your health care provider. Make sure you discuss any questions you have with your health care provider.

## 2024-08-20 NOTE — ED ADULT TRIAGE NOTE - BMI (KG/M2)
POPULATION HEALTH-Non-compliant report chart audits/DIABETES. Chart review completed for HM test overdue (mammograms, Colonoscopies, pap smears, DM labs, and/or EYE EXAMs)      RE:  The patient is due for diabetic lab testing and office visit.     Care Everywhere and media, updates requested and reviewed.      [facility] and [facility] reviewed    Attempted to call patient.  No answer, could not leave voice mail.  
25.4

## 2024-08-20 NOTE — ED PROVIDER NOTE - CLINICAL SUMMARY MEDICAL DECISION MAKING FREE TEXT BOX
62-year-old female history of diabetes, hypertension, recurrent falls presents with bilateral lower extremity swelling and pain x 6 months.    Differential diagnosis includes but not limited to CHF, nephrotic syndrome, diabetic neuropathy.  Will order : CBC, CMP, BNP, trop, UA, furosemide, will re-evaluate, likely d/c home with PCP f/u

## 2024-08-20 NOTE — ED ADULT TRIAGE NOTE - CHIEF COMPLAINT QUOTE
Patient brought in by son for leg swelling. Pitting edema noted to b/l lower extremities. Son reports she had 3 falls last week, denies headstrike/LOC/anticoagulation. Patient appears well, denies chest pain/SOB. Phx Parkinson's Patient brought in by son for leg swelling. Pitting edema noted to b/l lower extremities. Son reports she had 3 falls last week, denies headstrike/LOC/anticoagulation. Patient appears well, denies chest pain/SOB. Phx Parkinson's. Patient has drooping of R eyelid, son reports this is her baseline

## 2024-08-20 NOTE — ED PROVIDER NOTE - PROGRESS NOTE DETAILS
Lio Willis PGY3:  had lengthy conversation about importance of medicine compliance. Will provide info for new PCP. Sent script for metformin and lasix. Reviewed return precautions. Pt states she feels better. Discussed at length with interpret about medication compliance and PCP f/u. Pt able to verbalize and understand instructions.     Ky Hernandez MD, PGY1

## 2024-08-20 NOTE — ED PROVIDER NOTE - OBJECTIVE STATEMENT
62-year-old female history of diabetes, hypertension, recurrent falls presents with bilateral lower extremity swelling and pain x 6 months. Patient is non-adherent with medications, does not see a cardiologist, last saw PCP 1 month ago. Pt states she stopped taking her furosemide x 4 months ago. Pt states she has a burning pain in b/l feet that radiates up her legs. States she has a chronic UTI for 4 months, treated with unknown abx without relief, c/o dysuria, subjective fevers. Pt is able to urinate and have bowel movements. Has paresthesia in b/l feet. Ambulatory with assistance.     Denies SOB, CP, abdominal pain, n/v/d, hematuria

## 2024-08-20 NOTE — ED PROVIDER NOTE - PATIENT PORTAL LINK FT
You can access the FollowMyHealth Patient Portal offered by [facility] by registering at the following website: [email or web address]. By joining [employer]’s FollowMyHealth portal, you will also be able to view your health information using other applications (apps) compatible with our system.

## 2024-08-20 NOTE — ED PROVIDER NOTE - PHYSICAL EXAMINATION
Const: Awake, alert, no acute distress.  Well appearing.  Moving comfortably on stretcher.  HEENT: NC/AT.  Moist mucous membranes.  No pharyngeal erythema, no exudates.  Eyes: Extraocular movements intact b/l.  Conjunctiva pink.  No scleral icterus.  Neck: Neck supple, full ROM without pain.  Cardiac: Regular rate and regular rhythm. S1 S2 present.  Peripheral pulses 2+ and symmetric. 2+ LE edema  Resp: Speaking in full sentences. No evidence of respiratory distress.  Breath sounds clear to auscultation b/l. Normal chest excursion.   Abd: Non-distended, no overlying skin changes.  Soft, non-tender, no guarding, no rigidity, no rebound tenderness.  No palpable masses.    Back: Spine midline and non-tender. No CVAT.  Skin: Normal coloration.  No rashes, abrasions or lacerations.  Neuro: Awake, alert & oriented x 3.  Moves all extremities spontaneously.  No focal deficits.

## 2024-08-20 NOTE — ED ADULT NURSE NOTE - OBJECTIVE STATEMENT
Pt received in no acute distress, with son at bedside with son providing history. Son reports pt has been having swelling in her legs with intermittent pain for a couple of months, and has been having recurrent falls with 2 falls 3 days ago on carpet.  Pt has not been taking her medication. Pt walks with walker. No obvious injuries noted, and pt able to stand and ambulate with walker. Labs drawn, IV established, pt assisted to restroom urine collected and sent.

## 2024-08-20 NOTE — ED ADULT NURSE NOTE - CHIEF COMPLAINT QUOTE
Patient brought in by son for leg swelling. Pitting edema noted to b/l lower extremities. Son reports she had 3 falls last week, denies headstrike/LOC/anticoagulation. Patient appears well, denies chest pain/SOB. Phx Parkinson's. Patient has drooping of R eyelid, son reports this is her baseline

## 2024-08-20 NOTE — ED ADULT NURSE NOTE - DRUG PRE-SCREENING (DAST -1)
Obstetrical Admission History and Physical     Vonda Francis is a 27 y.o.  at 38w4d. JACINTO: 2024, by Ultrasound. Estimated fetal weight: 8 lbs. She has had prenatal care with [facility] .    Pregnancy notable for:  -IVF pregnancy  -depression  -hypothyroidism  -previous pregnancy with HTN and GDM, normal this pregnancy      Chief Complaint: Contractions and Leakage/Loss of Fluid    Assessment/Plan     27 y.o. G 2 P1  at 38.4 wks presents in labor with SROM  -admit to L&D  -IVF, T&S, CBC  -CEFM  -Epidural when desires  -GBS neg    Principal Problem:    Term pregnancy  Active Problems:    Gestational hypertension      Pregnancy Problems (from 23 to present)       Problem Noted Resolved    Gestational hypertension 2/15/2024 by ABDULAZIZ Napoles-CRNA Yes.  Previous pregnancy    Priority:  Medium      Term pregnancy 2/15/2024 by Janene Kramer MD No    Priority:  Medium      36 weeks gestation of pregnancy 2024 by Jason Irby MD No    Priority:  Medium      Conceived by in vitro fertilization 2024 by Stephanie Faria MD No    Priority:  Medium      Suspected fetal abnormality affecting management of mother, antepartum 2023 by Jane Vigil MD No    Priority:  Medium            Options for delivery have been discussed with the patient and she elects a vaginal delivery.  Labor has been discussed in detail. The risks, benefits, complications, alternatives, expected outcomes, potential problems during recuperation and recovery, and the risks of not performing the procedure were discussed with the patient. The patient stated understanding that the risks of delivery include, but are not limited to: death; reaction to medications; injury to bowel, bladder, ureters, uterus, cervix, vagina, and other pelvic and abdominal structures, infection; blood loss and possible need for transfusion; and potential need for surgery, including hysterectomy. The risks of injury to the infant  during delivery were also discussed. All questions were answered. There was concurrence with the planned procedure, and the patient wanted to proceed.    Admit to inpatient status. I anticipate that this patient will require a stay exceeding at least 2 midnights for delivery and postpartum care.  Active management of labor.  Management of pregnancy complications, as indicated.    Vicky Ferrari is here complaining of q5  min contractions and SROM at 0800 today of clear fluid. Good fetal movement. Denies vaginal bleeding.      Obstetrical History   OB History    Para Term  AB Living   2 1 1 0 0 1   SAB IAB Ectopic Multiple Live Births   0 0 0 0 1      # Outcome Date GA Lbr Jesse/2nd Weight Sex Delivery Anes PTL Lv   2 Current            1 Term 22 38w3d  3.629 kg M Vag-Spont EPI N HERNESTO      Complications: Gestational hypertension, Gestational diabetes,  product of IVF pregnancy       Past Medical History  Past Medical History:   Diagnosis Date    Acid reflux     Encounter for gynecological examination (general) (routine) without abnormal findings 2016    Well woman exam with routine gynecological exam    Encounter for screening for infections with a predominantly sexual mode of transmission 2016    Screen for sexually transmitted diseases    Female infertility     Gestational diabetes     Gestational hypertension     Other nail disorders 2016    Nail deformity    Personal history of diseases of the skin and subcutaneous tissue 2015    History of paronychia of finger    Personal history of diseases of the skin and subcutaneous tissue 2016    History of folliculitis    Personal history of other infectious and parasitic diseases 2016    History of chlamydia infection    Personal history of other infectious and parasitic diseases 2016    History of chlamydia infection    Plantar wart 2016    Bilateral plantar wart    Plantar wart 2016     Plantar wart, left foot        Past Surgical History   Past Surgical History:   Procedure Laterality Date    SALPINGECTOMY Bilateral        Social History  Social History     Tobacco Use    Smoking status: Former     Types: Cigarettes     Passive exposure: Past    Smokeless tobacco: Never   Substance Use Topics    Alcohol use: Not Currently     Substance and Sexual Activity   Drug Use Never       Allergies  Penicillins     Medications  Medications Prior to Admission   Medication Sig Dispense Refill Last Dose    acetaminophen (Tylenol) 160 mg/5 mL liquid Take 650 mg by mouth every 4 hours if needed for mild pain (1 - 3).   2/14/2024 at 2100    levothyroxine (Synthroid, Levoxyl) 25 mcg tablet Take 1 tablet (25 mcg) by mouth once daily.   2/14/2024 at 0900    PRENATAL 2-IRON-FOLIC ACID-OM3 ORAL Take by mouth once daily.   2/14/2024 at 0900    sertraline (Zoloft) 25 mg tablet Take 1 tablet (25 mg) by mouth once daily. 30 tablet 0 2/14/2024 at 0900       Objective    Last Vitals  Temp Pulse Resp BP MAP O2 Sat     102 18 118/76   99 %     Physical Examination  GENERAL: Examination reveals a well developed, well nourished, gravid female in no acute distress. She is alert and cooperative.  ABDOMEN: soft, gravid, nontender, nondistended, no abnormal masses, no epigastric pain  FHR is  , with Accelerations, and a Category I tracing.    Lares reading:    CERVIX: 5 cm dilated, 70 % effaced, -2 station; MEMBRANES are SROM  PSYCHOLOGICAL: awake and alert; oriented to person, place, and time    Lab Review  Labs in chart were reviewed.     Statement Selected

## 2024-10-15 ENCOUNTER — EMERGENCY (EMERGENCY)
Facility: HOSPITAL | Age: 64
LOS: 1 days | Discharge: HOME | End: 2024-10-15
Attending: STUDENT IN AN ORGANIZED HEALTH CARE EDUCATION/TRAINING PROGRAM | Admitting: STUDENT IN AN ORGANIZED HEALTH CARE EDUCATION/TRAINING PROGRAM
Payer: MEDICAID

## 2024-10-15 VITALS
SYSTOLIC BLOOD PRESSURE: 151 MMHG | DIASTOLIC BLOOD PRESSURE: 84 MMHG | OXYGEN SATURATION: 100 % | HEART RATE: 78 BPM | HEIGHT: 64 IN | RESPIRATION RATE: 20 BRPM | TEMPERATURE: 98 F

## 2024-10-15 PROCEDURE — 99284 EMERGENCY DEPT VISIT MOD MDM: CPT

## 2024-10-15 RX ORDER — OXYCODONE HYDROCHLORIDE 30 MG/1
5 TABLET, FILM COATED, EXTENDED RELEASE ORAL ONCE
Refills: 0 | Status: DISCONTINUED | OUTPATIENT
Start: 2024-10-15 | End: 2024-10-15

## 2024-10-15 RX ORDER — LIDOCAINE 50 MG/G
1 CREAM TOPICAL ONCE
Refills: 0 | Status: COMPLETED | OUTPATIENT
Start: 2024-10-15 | End: 2024-10-15

## 2024-10-15 RX ORDER — KETOROLAC TROMETHAMINE 10 MG/1
15 TABLET, FILM COATED ORAL ONCE
Refills: 0 | Status: DISCONTINUED | OUTPATIENT
Start: 2024-10-15 | End: 2024-10-15

## 2024-10-15 RX ADMIN — LIDOCAINE 1 PATCH: 50 CREAM TOPICAL at 18:37

## 2024-10-15 RX ADMIN — OXYCODONE HYDROCHLORIDE 5 MILLIGRAM(S): 30 TABLET, FILM COATED, EXTENDED RELEASE ORAL at 18:37

## 2024-10-15 RX ADMIN — KETOROLAC TROMETHAMINE 15 MILLIGRAM(S): 10 TABLET, FILM COATED ORAL at 18:37

## 2024-10-15 NOTE — ED ADULT NURSE NOTE - OBJECTIVE STATEMENT
pt is a 63y old female received awake and responsive, c/o of bilateral hip and bilateral shoulder pain for few weeks, pt denies any trauma, ambulatory with assistance

## 2024-10-15 NOTE — ED PROVIDER NOTE - PROGRESS NOTE DETAILS
Gilberto Gonzalez DO: Patient reassessed, NAD, non-toxic appearing. improved sx. requesting dc. ambulatory.

## 2024-10-15 NOTE — ED PROVIDER NOTE - MDM ORDERS SUBMITTED SELECTION
Hypertension   What is hypertension?   Hypertension is blood pressure that keeps being higher than normal. Blood pressure is the force of blood against artery walls as the heart pumps blood through the body. Blood pressure can be unhealthy if it is above 120/80. The higher your blood pressure, the greater the health risk.   High blood pressure can be controlled if you take these steps:   Maintain a healthy weight.   Are physically active.   Follow a healthy eating plan, which includes foods that do not have a lot of salt and sodium.   Do not drink a lot of alcohol.   Our goal is to keep the systolic (top) number 128 or lower and the diastolic (bottom) number 83 or lower      
Medications

## 2024-10-15 NOTE — ED ADULT NURSE NOTE - IS THE PATIENT ABLE TO BE SCREENED?
[Initial Evaluation] : an initial evaluation [FreeTextEntry1] : Bilateral hip dysplasia [Patient] : patient [Parents] : parents Yes

## 2024-10-15 NOTE — ED PROVIDER NOTE - PATIENT PORTAL LINK FT
You can access the FollowMyHealth Patient Portal offered by [facility] by registering at the following website: [email or web address]. By joining [employer]’s FollowMyHealth portal, you will also be able to view your health information using other applications (apps) compatible with our system.

## 2024-10-15 NOTE — ED PROVIDER NOTE - ATTENDING CONTRIBUTION TO CARE
Gilberto Gonzalez DO:  patient seen and evaluated with the resident.  I was present for key portions of the History & Physical, and I agree with the Impression & Plan. 64 yo f pmh htn, hld, dm, spine surgery, pw bp. PW son bedside who provides collateral translation, official  declined.  Reports acute on chronic BP, worse on left side, worse with movement.  Nonradiating.  Did not travel down legs.  Denies N/C, F/C, cough, congestion, recent trauma, recent manipulation, recent falls.  Has been seen by multiple outpatient providers, recommended for injections, however has declined.  Has been to multiple OSH and received pain control per son.  Last MRI in May 2024, showing degenerative disease.  Came today due to acute exacerbation of chronic pain.  Had extensive discussion with patient and son bedside considering options, they would like to defer imaging at this time given extensive history of in the past.  Would just like pain control.  Do not suspect cord compression, cauda equina, acute abdomen.  Will treat symptoms, provide spine center follow-up and likely DC with outpatient follow-up. denies hx malignancy, trauma, iv drug use, bowel/bladder incontinence, saddle anesthesia. NO spinal ml ttp.

## 2024-10-15 NOTE — ED PROVIDER NOTE - PHYSICAL EXAMINATION
General: well appearing, interactive, well nourished, no apparent distress, ncat  HEENT: EOMI, PERRLA, normal mucosa, normal oropharynx, no lesions on the lips or on oral mucosa, normal external ear  Neck: supple, no lymphadenopathy, full range of motion, no nuchal rigidity  CV: RRR, normal S1 and S2 with no murmur, capillary refill less than two seconds, pp 2+   Resp: lungs CTA b/l, good aeration bilaterally, symmetric chest wall   Abd: non-distended, soft, non-tender, no guarding/rebound  : no CVA tenderness  MSK: full range of motion, no cyanosis, no edema, no clubbing, no immobility, no spinal ml ttp  Neuro: CN II-XII grossly intact, muscle strength 5/5 in all extremities, silt in all extremities   Skin: no rashes, skin intact

## 2024-10-15 NOTE — ED PROVIDER NOTE - NSFOLLOWUPINSTRUCTIONS_ED_ALL_ED_FT
1) Please follow up with your Primary Care Provider in 24-48 hours  2) Seek immediate medical care for any new or returning symptoms including but not limited severe pain, high fevers, difficulty breathing  3) Take Tylenol 650 mg every 4-6 hours as needed for pain. Do not take more than 2 grams within a 24 hour period  4) Take Ibuprofen 400-600 mg every 4-6 hours as needed for pain. Do not take more than 1200 mg within a 24 hour period. Take this medication with food   5) Buy Salonpas 4% lidocaine patch. Place over area of greatest pain.  Apply for 12 hours at a time.  Do not use more than 2 patches per day.

## 2025-06-30 ENCOUNTER — APPOINTMENT (OUTPATIENT)
Dept: ORTHOPEDIC SURGERY | Facility: CLINIC | Age: 65
End: 2025-06-30